# Patient Record
(demographics unavailable — no encounter records)

---

## 2025-03-25 NOTE — HISTORY OF PRESENT ILLNESS
[Dull/Aching] : dull/aching [Localized] : localized [Sharp] : sharp [Tightness] : tightness [de-identified] : Has left hip pain past few months, no injury. Has hard time laying on side at night. Has occasional back soreness. She is active with exercise [] : no [FreeTextEntry1] : LFT hip [FreeTextEntry5] : LFT hip pain that developed a few months ago. Denies injury/trauma. Denies prior treatment.

## 2025-03-25 NOTE — PROCEDURE
[Large Joint Injection] : Large joint injection [Left] : of the left [Greater Trochanteric Bursa] : greater trochanteric bursa [Pain] : pain [Alcohol] : alcohol [Betadine] : betadine [Ethyl Chloride sprayed topically] : ethyl chloride sprayed topically [Sterile technique used] : sterile technique used [___ cc    3mg] :  Betamethasone (Celestone) ~Vcc of 3mg [___ cc    1%] : Lidocaine ~Vcc of 1%  [] : Patient tolerated procedure well [Call if redness, pain or fever occur] : call if redness, pain or fever occur [Apply ice for 15min out of every hour for the next 12-24 hours as tolerated] : apply ice for 15 minutes out of every hour for the next 12-24 hours as tolerated [Patient was advised to rest the joint(s) for ____ days] : patient was advised to rest the joint(s) for [unfilled] days [Previous OTC use and PT nontherapeutic] : patient has tried OTC's including aspirin, Ibuprofen, Aleve, etc or prescription NSAIDS, and/or exercises at home and/or physical therapy without satisfactory response [Patient had decreased mobility in the joint] : patient had decreased mobility in the joint [Risks, benefits, alternatives discussed / Verbal consent obtained] : the risks benefits, and alternatives have been discussed, and verbal consent was obtained

## 2025-05-12 NOTE — PLAN
[FreeTextEntry1] : Patient is a 54-year-old  4 para 4 last menstrual period May 2024 Patient presents for sonogram valuation with history of for some irregular bleeding over the past year Pelvic sonogram Uterus five 7.7 x 5.44 x 4.58 Cervical length 2.35 cm Fundal intramural fibroid measuring 1.01 x 1.14 x 0.81 Posterior intramural fibroid measuring 1.70 x 1.53 x 1.08 Endometrial thickness 7.5 mm Right ovary not seen Left ovary 3.36 x 3.82 x 3.08 with a simple cyst measuring 1.99 x 2.71 x 2.05 with normal color Doppler flow No adnexal masses No free fluid Results discussed with patient Patient will be scheduled for endometrial sampling to complete evaluation  Patient was seen in the office consultation with discussion of the results and not examined during this visit

## 2025-05-12 NOTE — PLAN
[FreeTextEntry1] : Patient 54-year-old  4 para 4 last menstrual period 2023 Presents for annual visit,, Patient states she has had 1 to 2 days of spotting,, May 2024, 2024, 2025, and March and 2025 Physical exam reveals a well-developed well-nourished female no apparent distress,, BMI 19 Heart regular rhythm and rate, lungs clear, breast no mass nontender no skin change no nipple discharge adenopathy, bilateral breast implants in place, reconstruction, abdomen soft nontender no organomegaly Pelvic exam shows normal female external genitalia, vagina no lesions, cervix appropriate size nontender, uterus anteverted normal size nontender, adnexa no mass nontender Pap was performed Patient will be scheduled for breast mammogram sonogram Patient states she had negative colonoscopy approximately 3 years prior Essentially benign GYN exam Patient will return for pelvic sonogram to further evaluate endometrial lining with history of irregular spotting  Eleonora was present as a chaperone for the entire assessment and examination of this patient

## 2025-05-12 NOTE — HISTORY OF PRESENT ILLNESS
[FreeTextEntry1] : Patient is a 54-year-old  4 para 4 last menstrual period in May 2024 Patient presents for cervical evaluation with history of some irregular bleeding over the past year

## 2025-05-12 NOTE — PHYSICAL EXAM
[MA] : MA [Appropriately responsive] : appropriately responsive [Alert] : alert [No Acute Distress] : no acute distress [No Lymphadenopathy] : no lymphadenopathy [Regular Rate Rhythm] : regular rate rhythm [No Murmurs] : no murmurs [Clear to Auscultation B/L] : clear to auscultation bilaterally [Soft] : soft [Non-tender] : non-tender [Non-distended] : non-distended [No HSM] : No HSM [No Lesions] : no lesions [No Mass] : no mass [Oriented x3] : oriented x3 [Examination Of The Breasts] : a normal appearance [] : implants [Breast Reconstruction Right] : breast reconstruction [Breast Reconstruction Left] : breast reconstruction [No Masses] : no breast masses were palpable [Labia Majora] : normal [Labia Minora] : normal [Normal] : normal [Anteversion] : anteverted [Uterine Adnexae] : normal [FreeTextEntry2] : Eleonora [FreeTextEntry6] : No masses, nontender, no skin changes, no nipple discharge, no adenopathy,, bilateral breast implants in place,, reconstruction [Tenderness] : nontender [Mass ___ cm] : no uterine mass was palpated

## 2025-05-12 NOTE — HISTORY OF PRESENT ILLNESS
[FreeTextEntry1] : Patient 54-year-old  4 para 4 last menstrual period 2023 Presents for annual visit,, complaining of some irregular spotting in May 2024, 2024, 2025, March and 2025 Patient states the spotting usually lasted 1 to 2 days

## 2025-05-19 NOTE — PHYSICAL EXAM
[Moderate] : moderate swelling of lateral ankle [Mild] : mild swelling of medial ankle [4___] : inversion 4[unfilled]/5 [5___] : eversion 5[unfilled]/5 [2+] : posterior tibialis pulse: 2+ [Normal] : saphenous nerve sensation normal [] : patient ambulates without assistive device [Right] : right foot [Weight -] : weightbearing [There are no fractures, subluxations or dislocations. No significant abnormalities are seen] : There are no fractures, subluxations or dislocations. No significant abnormalities are seen [FreeTextEntry9] :  AP and mortise xrays of the ankle were taken and reviewed today. [de-identified] :  AP, lateral and oblique xray views were taken and reviewed today. [TWNoteComboBox7] : dorsiflexion 10 degrees [de-identified] : plantar flexion 30 degrees [de-identified] : inversion 20 degrees [de-identified] : eversion 10 degrees

## 2025-05-19 NOTE — HISTORY OF PRESENT ILLNESS
[de-identified] : Patient is a 54 old F complaining of Rt ankle pain. Pt states she fell on 5/17/25.  She reports pain and swelling and is able to ambulate without assistance.  Denies any prior injury to that area. No further treatment. WB in sneakers

## 2025-05-19 NOTE — DISCUSSION/SUMMARY
[de-identified] : The patient was explained that they have an ankle sprain. She was wrapped in an ace bandage today. Weight bearing in a supportive brace was recommended. She will obtain one on her own. Icing for 20 minutes 2-3 times per day was instructed. Physical therapy was prescribed, and home range of motion exercises were encouraged.  The patient was prescribed meloxicam 15mg daily as needed for pain.  They were explained the potential risks of GI pain/bleeding as well as hypertension.  They were told not to take any other nsaids while taking this medication.  Physical therapy was prescribed for 2-3 times per week for four weeks.

## 2025-05-22 NOTE — PROCEDURE
[Endometrial Biopsy] : Endometrial biopsy [Consent Obtained] : Consent obtained [Irregular Bleeding] : irregular uterine bleeding [Post-Menop. Bleeding] : post-menopausal bleeding [Risks] : risks [Alternatives] : alternatives [Patient] : patient [Infection] : infection [Bleeding] : bleeding [Negative] : negative pregnancy test [No Premedication] : No premedication [Tenaculum] : Tenaculum [Easy Passage] : Easy passage [Sounded to ___ cm] : sounded to [unfilled] ~Ucm [Anteverted] : anteverted [Scant] : scant [Specimen Collected] : collected [Sent to Pathology] : placed in buffered formalin and sent for pathology [Tolerated Well] : Patient tolerated the procedure well [No Complications] : No complications [LMPDate] : May 2024 [de-identified] : Pipelle endometrial sampling catheter

## 2025-05-22 NOTE — ASSESSMENT
[FreeTextEntry1] : Patient is a 54-year-old  4 para 4 last menstrual period May 2024 Patient states she had spotting for 2 to 3 days and  then again in  and then again in April for thousand 25 Patient was placed in the exam room and after informed consent was obtained patient was placed in the dorsolithotomy position Urine pregnancy test was negative Sterile speculum present vaginal vault cervix visualized and cleansed along the vaginal vault with Betadine Anterior to the cervix grasped with a single-tooth tenaculum Easy passage of Pipelle endometrial sampling catheter into the mid cavity was accomplished and scant amount tissue was obtained Patient was in the pathology All instruments removed the vaginal vault Patient tolerated well No complications Follow-up 2 weeks to discuss results  Gerri was present as a chaperone for the entire assessment and examination this patient endorses an assistant

## 2025-05-22 NOTE — PROCEDURE
[Endometrial Biopsy] : Endometrial biopsy [Consent Obtained] : Consent obtained [Irregular Bleeding] : irregular uterine bleeding [Post-Menop. Bleeding] : post-menopausal bleeding [Risks] : risks [Alternatives] : alternatives [Patient] : patient [Infection] : infection [Bleeding] : bleeding [Negative] : negative pregnancy test [No Premedication] : No premedication [Tenaculum] : Tenaculum [Easy Passage] : Easy passage [Sounded to ___ cm] : sounded to [unfilled] ~Ucm [Anteverted] : anteverted [Scant] : scant [Specimen Collected] : collected [Sent to Pathology] : placed in buffered formalin and sent for pathology [Tolerated Well] : Patient tolerated the procedure well [No Complications] : No complications [LMPDate] : May 2024 [de-identified] : Pipelle endometrial sampling catheter

## 2025-06-05 NOTE — HISTORY OF PRESENT ILLNESS
[FreeTextEntry1] : Patient is a 54-year-old  4 para 4 last menstrual period May 2024 Presents for follow-up discussion after undergoing endometrial biopsy for postmenopausal bleeding

## 2025-06-05 NOTE — PLAN
[FreeTextEntry1] : Patient is a 54-year-old  4 para 4 last menstrual period May 2024 Patient presents for follow-up after undergoing Chaz biopsy on May 22, 2025 for postmenopausal bleeding Pathology report showed proliferative endometrium Results discussed with patient,, extensively,, and discussed possible simple versus complex hyperplasia with atypia Reassured Patient has history of breast cancer underwent a mastectomy  followed by 5 years of tamoxifen use Patient not a candidate for treatment with Provera in a cyclic pattern Follow-up 6 months or prior to that if bleeding persists Questions answered Patient states she understands  Patient was seen in the office in consultation with discussion of the results and not examined during this visit